# Patient Record
Sex: FEMALE | Race: OTHER
[De-identification: names, ages, dates, MRNs, and addresses within clinical notes are randomized per-mention and may not be internally consistent; named-entity substitution may affect disease eponyms.]

---

## 2020-06-07 NOTE — ER DOCUMENT REPORT
ED Medical Screen (RME)





- General


Stated Complaint: BELLY BUTTON INJURY


Time Seen by Provider: 06/07/20 23:39


Mode of Arrival: Ambulatory


Information source: Patient


Notes: 





Patient is an otherwise healthy 19-year-old female presenting to the emergency 

department chief complaint of bleeding from a new piercing on her bellybutton.  

She states this started around 2:00 this afternoon immediately after having the 

piercing done.  She states it has been continuously bleeding since that time.





Active bleeding noted in triage.  New dressing applied.





I have greeted and performed a rapid initial assessment of this patient.  A 

comprehensive ED assessment and evaluation of the patient, analysis of test 

results and completion of the medical decision making process will be conducted 

by additional ED providers.  I have specifically instructed the patient or 

family members with the patient to immediately return to any nursing staff 

should anything change in the patient's condition or with their chief complaint.








Physical Exam





- Vital signs


Vitals: 





                                        











Temp Pulse Resp BP Pulse Ox


 


 99.1 F   127 H  20   156/102 H  99 


 


 06/07/20 23:37  06/07/20 23:37  06/07/20 23:37  06/07/20 23:37  06/07/20 23:37














Course





- Vital Signs


Vital signs: 





                                        











Temp Pulse Resp BP Pulse Ox


 


 99.1 F   127 H  20   156/102 H  99 


 


 06/07/20 23:41  06/07/20 23:37  06/07/20 23:37  06/07/20 23:37  06/07/20 23:37

## 2020-06-08 NOTE — ER DOCUMENT REPORT
Entered by LAWANDA MUÑOZ SCRIBE  20 [id number] 





Acting as scribe for:FLOR BRIZUELA IV, MD





ED Wound





- General


Chief Complaint: Puncture Wound


Stated Complaint: BELLY BUTTON INJURY


Time Seen by Provider: 20 23:39


Mode of Arrival: Ambulatory


Information source: Patient


Notes: 





This 19 year old female patient presents to the ED today with complaints of 

bleeding from a new belly button piercing. Patient states that the bleeding 

started immediately after she got the piercing around 1400 yesterday afternoon 

and that it has continued to bleed since. She notes pain in that area, which was

expected, but denies any other symptoms.





Past Medical History





- General


Information source: Patient





- Social History


Smoking Status: Never Smoker


Cigarette use (# per day): No


Chew tobacco use (# tins/day): No


Smoking Education Provided: No


Family History: Reviewed & Not Pertinent


Patient has suicidal ideation: No


Patient has homicidal ideation: No





Review of Systems





- Review of Systems


Constitutional: No symptoms reported


EENT: No symptoms reported


Cardiovascular: No symptoms reported


Respiratory: No symptoms reported


Gastrointestinal: See HPI, Abdominal pain


Genitourinary: No symptoms reported


Female Genitourinary: No symptoms reported


Musculoskeletal: No symptoms reported


Skin: See HPI, Other - Bleeding from belly button piercing


Hematologic/Lymphatic: No symptoms reported


Neurological/Psychological: No symptoms reported


-: Yes All other systems reviewed and negative





Physical Exam





- Vital signs


Vitals: 


                                        











Temp Pulse Resp BP Pulse Ox


 


 99.1 F   127 H  20   156/102 H  99 


 


 20 23:37  20 23:37  20 23:37  20 23:37  20 23:37














- General


General appearance: Alert


In distress: None





- HEENT


Head: Normocephalic, Atraumatic


Eyes: Normal


Pupils: PERRL





- Respiratory


Respiratory status: No respiratory distress


Chest status: Nontender


Breath sounds: Normal


Chest palpation: Normal





- Cardiovascular


Rhythm: Regular


Heart sounds: Normal auscultation


Murmur: No


Friction rub: No


Gallop: None auscultated





- Abdominal


Inspection: Other - Belly button piercing


Distension: No distension


Bowel sounds: Normal


Tenderness: Nontender - Abdomen soft


Organomegaly: No organomegaly





- Back


Back: Normal, Nontender





- Extremities


General upper extremity: Normal inspection


General lower extremity: Normal inspection





- Neurological


Neuro grossly intact: Yes


Orientation: AAOx4


[place] Coma Scale Eye Opening: Spontaneous


Frazer Coma Scale Verbal: Oriented


[place]er Coma Scale Motor: Obeys Commands


Denise Coma Scale Total: 15





- Psychological


Associated symptoms: Normal affect, Normal mood





- Skin


Skin irregularity: other - Belly button piercing with persistent bleeding





Course





- Re-evaluation


Re-evalutation: 





20 01:59


Treatment and plan of care discussed with patient.  Patient instructed to return

in 5 days for suture removal.  Emergency signs and symptoms, reasons to return 

to the emergency department discussed patient.  All questions were answered 

prior to discharge.





- Vital Signs


Vital signs: 


                                        











Temp Pulse Resp BP Pulse Ox


 


 100.2 F   110 H  18   162/104 H  99 


 


 20 00:15  20 00:15  20 00:15  20 00:15  20 00:15














Procedures





- Laceration/Wound Repair


  ** Abdomen


Time completed: 02:00 - To persistently bleeding punctate sites one present in 

the same Manish her umbilicus and 1 just superior to the umbilicus.  The new 

piercing was left in and sutures were placed above and below where the piercing 

ends emanating from


Wound length (cm): 0.2


Wound's Depth, Shape: Superficial


Laceration pre-procedure: Sterile PPE donned, Chloraprep applied, Sterile drapes

applied


Anesthetic type: 1% Lidocaine w/epi


Volume Anesthetic (mLs): 5


Wound explored: Clean


Wound Repaired With: Sutures


Suture Size/Type: 4:0, Prolene


Number of Sutures: 4 - Simple interrupted


Layer Closure?: No


Post-procedure wound care: Sterile dressing applied


Post-procedure NV exam normal: Yes


Complications: No





Discharge





- Discharge


Clinical Impression: 


Puncture wound of abdomen


Qualifiers:


 Encounter type: initial encounter Qualified Code(s): S31.139A - Puncture wound 

of abdominal wall without foreign body, unspecified quadrant without penetration

into peritoneal cavity, initial encounter





Condition: Good


Disposition: HOME, SELF-CARE


Additional Instructions: 


Return to the Emergency Department without delay if any worse.





Follow-up with your doctor or return to the emergency department in 5 days for 

suture removal.  Seek medical care sooner if your piercing sites become very 

red, swollen, painful or ooze pus or start to persistently bleed again.











HOME CARE INSTRUCTIONS & INFORMATION:  Thank you for choosing us for your 

medical needs. We hope you're satisfied with the care you received.  After you 

leave, you must properly care for your problem and, at the same time, observe 

its progress.  Any condition can change.  Some illnesses can change rapidly over

hours or days.  If your condition worsens, return to the Emergency Department or

see your physician promptly.





ABOUT YOUR X-RAYS AND EKG'S:   If you had an EKG or X-rays taken, they have been

read by the Emergency Physician. The X-rays and EKG's will also be read by a 

Radiologist or Cardiologist within 24 hours.  If discrepancies are noted, you 

will be notified by telephone.  Please be certain the ED has a correct telephone

number & address where you can be reached.  Also, realize that some fractures or

abnormalities do not show up on initial X-rays.  If your symptoms continue, see 

your physician.





ABOUT YOUR LABORATORY TEST:   If you had laboratory tests, the results have been

reviewed by the Emergency Physician.  Some test results (for example cultures) 

may not be available for several days.  You will be contacted if any test result

shows you need additional treatment.  Please be certain the ED has a correct 

telephone number and address where you can be reached.





ABOUT YOUR MEDICATIONS:  You will receive instructions on how to take your 

medicine on the prescription label you receive.  Additional information may be 

provided by the Pharmacy.  If you have questions afterwards, call the ED for 

clarification or further instructions.  Some prescribed medications may cause 

drowsiness.  Do not perform tasks such as driving a car or operating machinery 

without consulting your Pharmacist.  If you feel you need a refill of pain 

medication, your condition will need re-evaluation.  Please do not call for a 

refill of any medication.





ABOUT YOUR SIGNATURE:   Signature of this document acknowledges to followin. Understanding that you received emergency treatment and that you may be 

   released before al medical problems are known or treated. Please be certain  

   the ED has a correct phone number & address where you can be reached.


   2. Acknowledgement that you will arrange for follow-up care as recommended.


   3. Authorization for the Emergency Physician to provide information to your 

follow-up Physician in order to maximize your care.





AT ANY TIME, IF YOUR SYMPTOMS CHANGE SIGNIFICANTLY OR WORSEN OR YOU DEVELOP NEW 

SYMPTOMS, RETURN TO THE EMERGENCY DEPARTMENT IMMEDIATELY FOR RE-EVALUATION.





OUR GOAL IS TO PROVIDE EXCELLENT MEDICAL CARE!





WE HOPE THAT WE HAVE MET YOUR EXPECTATIONS DURING YOUR EMERGENCY DEPARTMENT 

VISIT AND THAT YOU FEEL YOU HAVE RECEIVED EXCELLENT CARE!














I personally performed the services described in the documentation, reviewed and

edited the documentation which was dictated to the scribe in my presence, and it

accurately records my words and actions.

## 2020-06-13 NOTE — ER DOCUMENT REPORT
ED Suture/Wound Recheck





- General


Chief Complaint: Suture Removal


Stated Complaint: REMOVAL OF SUTURES


Time Seen by Provider: 06/13/20 13:40


Primary Care Provider: 


[facility] [Provider Group] - Follow up as needed


[facility] [Provider Group] - Follow up as needed


Mode of Arrival: Ambulatory


Information source: Patient


Notes: 





19-year-old female presented to ED for suture removal from her umbilical area.  

She states that she had a bellybutton ring placed on the seventh and they 

somehow hit a artery that bled for about 7 hours and she had to have sutures 

placed in order to stop the bleeding.  She states she was told to have them 

removed in 5 days.  She is now the 13th and is here to have her sutures removed.

 There is no signs of any infection no bleeding noted.  She states she is not 

having any pain or discomfort.  We will remove the sutures at this time.  She 

still has the bellybutton ring in place and there is no signs of infection.





- HPI


Previous ED treatment: Bellybutton piercing bleeding


Quality of pain: No pain


Severity: None


Pain Level: Denies


Context: Other - Bellybutton piercing


Symptoms since procedure: No complaints


Exacerbated by: Denies


Relieved by: Denies





- Related Data


Allergies/Adverse Reactions: 


                                        





No Known Allergies Allergy (Unverified 06/13/20 13:40)


   











Past Medical History





- General


Information source: Patient





- Social History


Smoking Status: Never Smoker


Frequency of alcohol use: None


Drug Abuse: None


Family History: Reviewed & Not Pertinent


Patient has homicidal ideation: No





- Past Medical History


Cardiac Medical History: Reports: None


Pulmonary Medical History: Reports: None


EENT Medical History: Reports: None


Neurological Medical History: Reports: None


Endocrine Medical History: Reports: None


Renal/ Medical History: Reports: None


Malignancy Medical History: Reports: None


GI Medical History: Reports: None


Musculoskeletal Medical History: Reports None


Skin Medical History: Reports None


Psychiatric Medical History: Reports: None


Traumatic Medical History: Reports: None





Review of Systems





- Review of Systems


Constitutional: No symptoms reported


EENT: No symptoms reported


Cardiovascular: No symptoms reported


Respiratory: No symptoms reported


Gastrointestinal: No symptoms reported


Genitourinary: No symptoms reported


Female Genitourinary: No symptoms reported


Musculoskeletal: No symptoms reported


Skin: Other - Sutures around the leg is due to a "bellybutton ring "


Hematologic/Lymphatic: No symptoms reported


Neurological/Psychological: No symptoms reported


-: Yes All other systems reviewed and negative





Physical Exam





- Vital signs


Vitals: 


                                        











Temp Pulse Resp BP Pulse Ox


 


 99.1 F   99 H  16   122/71   100 


 


 06/13/20 13:39  06/13/20 13:39  06/13/20 13:39  06/13/20 13:39  06/13/20 13:39











Interpretation: Normal





- General


General appearance: Appears well, Alert





- HEENT


Head: Normocephalic, Atraumatic


Eyes: Normal


Pupils: PERRL





- Respiratory


Respiratory status: No respiratory distress


Chest status: Nontender


Breath sounds: Normal


Chest palpation: Normal





- Cardiovascular


Rhythm: Regular


Heart sounds: Normal auscultation


Murmur: No





- Abdominal


Inspection: Normal, Other - Sutures around both ends of the bellybutton 

piercing.  No redness no inflammation no drainage.  Area has healed well no 

signs of infection


Distension: No distension


Bowel sounds: Normal


Tenderness: Nontender


Organomegaly: No organomegaly





- Back


Back: Normal, Nontender





- Extremities


General upper extremity: Normal inspection, Nontender, Normal color, Normal ROM,

Normal temperature


General lower extremity: Normal inspection, Nontender, Normal color, Normal ROM,

Normal temperature, Normal weight bearing.  No: Rhonda's sign





- Neurological


Neuro grossly intact: Yes


Cognition: Normal


Orientation: AAOx4


[place] Coma Scale Eye Opening: Spontaneous


[place] Coma Scale Verbal: Oriented


[place] Coma Scale Motor: Obeys Commands


Denise Coma Scale Total: 15


Speech: Normal


Motor strength normal: LUE, RUE, LLE, RLE


Sensory: Normal





- Psychological


Associated symptoms: Normal affect, Normal mood





- Skin


Skin Temperature: Warm


Skin Moisture: Dry


Skin Color: Normal





Course





- Re-evaluation


Re-evalutation: 





06/13/20 22:17


Sutures removed, bacitracin applied, patient was discharged home to follow-up 

with her primary care doctor.  Patient tolerated procedure well.





- Vital Signs


Vital signs: 


                                        











Temp Pulse Resp BP Pulse Ox


 


 99.1 F   99 H  16   122/71   100 


 


 06/13/20 13:40  06/13/20 13:39  06/13/20 13:39  06/13/20 13:39  06/13/20 13:39














Discharge





- Discharge


Clinical Impression: 


 Visit for suture removal





Condition: Stable


Disposition: HOME, SELF-CARE


Instructions:  Suture Removal


Additional Instructions: 


SOAP CLEANSING:


     Gently wash the wound daily using a mild soap (like Ivory, Phisoderm, 

Neutrogena).  Use warm water, rubbing gently until all debris, ooze, and 

crusting have been washed from the wound.  Allow to dry briefly (about 10 

minutes) after cleaning.  Repeat this cleansing at least three times a day for 

the first two days and then once or twice a day.








ANTIBIOTIC OINTMENT PROTECTION:


     Your wounds are such that dressing them is not practical or optional.  

After cleansing, you should apply a thin coating of antibiotic ointment 

(Bacitracin, not Neosporin) to the wounds at least three times daily.  This 

lessens infection risk, and may decrease the amount of scarring.  Use a q-tip or

dull butter knife, not your finger, to apply this ointment.


     Any debris or ooze which builds up in the ointment should be gently rubbed 

off with a sterile gauze pad.  Harder crusting may need to be gently scrubbed 

off with a clean wash cloth with soap and warm water, perhaps applying a warm, 

wet wash cloth to the wound for ten minutes first.


     Development of redness, severe itching, or blistering may mean allergy to 

the ointment.  See the doctor.





FOLLOW-UP CARE:


If you have been referred to a physician for follow-up care, call the 

physicians office for an appointment as you were instructed or within the next 

two days.  If you experience worsening or a significant change in your symptoms,

notify the physician immediately or return to the Emergency Department at any 

time for re-evaluation.


Referrals: 


[facility] [Provider Group] - Follow up as needed


[facility] [Provider [facility]] - Follow up as needed

## 2020-06-30 NOTE — ER DOCUMENT REPORT
HPI





- HPI


Patient complains to provider of: UTI


Time Seen by Provider: 06/30/20 11:00


Onset: Last week


Onset/Duration: Persistent


Quality of pain: Burning


Pain Level: 3


Context: 





Patient presents complaining of dysuria and urgency for the past week.  Patient 

is concerned about possible UTI.  Patient did finish antibiotics to treat strep 

throat recently.  Patient does have concerns about possible STI.  No flank or 

abdominal pain.


Associated Symptoms: denies: Fever, Nausea, Vomiting


Exacerbated by: Denies


Relieved by: Denies


Similar symptoms previously: Yes


Recently seen / treated by doctor: Yes





- ROS


ROS below otherwise negative: Yes


Systems Reviewed and Negative: Yes All other systems reviewed and negative





- CONSTITUTIONAL


Constitutional: DENIES: Fever, Chills





- GASTROINTESTINAL


Gastrointestinal: DENIES: Abdominal Pain, Nausea, Patient vomiting





- URINARY


Urinary: REPORTS: Dysuria, Urgency





- REPRODUCTIVE


Reproductive: DENIES: Pregnant:





- MUSCULOSKELETAL


Musculoskeletal: DENIES: Back Pain





- DERM


Skin Color: Normal


Skin Problems: None





Past Medical History





- General


Information source: Patient





- Social History


Smoking Status: Never Smoker


Frequency of alcohol use: None


Drug Abuse: None


Occupation: Dental assistant


Family History: Reviewed & Not Pertinent


Renal/ Medical History: Reports: Hx Ovarian Cysts


Surgical Hx: Negative





Vertical Provider Document





- CONSTITUTIONAL


Agree With Documented VS: Yes


Exam Limitations: No Limitations


General Appearance: WD/WN, No Apparent Distress





- HEENT


HEENT: Atraumatic, Normocephalic





- NECK


Neck: Normal Inspection, Supple





- RESPIRATORY


Respiratory: Breath Sounds Normal, No Respiratory Distress





- CARDIOVASCULAR


Cardiovascular: Regular Rate, Regular Rhythm





- GI/ABDOMEN


Gastrointestinal: Abdomen Soft





- BACK


Back: Normal Inspection.  negative: CVA Tenderness-Right, CVA Tenderness-Left





- MUSCULOSKELETAL/EXTREMETIES


Musculoskeletal/Extremeties: OLAYINKA FROM





- NEURO


Level of Consciousness: Awake, Alert, Appropriate


Motor/Sensory: No Motor Deficit





- DERM


Integumentary: Warm, Dry, No Rash





Course





- Re-evaluation


Re-evalutation: 





06/30/20


Patient with UTI, no concern for pyelonephritis or sepsis at this time.  Patient

reports mild nausea after taking the antibiotic.  Prescription will be written 

for some antiemetic medication.  Discussed worsening signs or symptoms that 

patient should return immediately for.





- Vital Signs


Vital signs: 


                                        











Temp Pulse Resp BP Pulse Ox


 


 98.5 F   82   14   124/85   98 


 


 06/30/20 10:16  06/30/20 10:16  06/30/20 10:16  06/30/20 10:16  06/30/20 10:16














- Laboratory


Laboratory results interpreted by me: 





06/30/20 11:58


                              Labs- All tests 24 hr











  06/30/20 06/30/20





  11:17 11:17


 


Urine Color  YELLOW 


 


Urine Appearance  CLOUDY 


 


Urine pH  7.0 


 


Ur Specific Gravity  1.015 


 


Urine Protein  NEGATIVE 


 


Urine Glucose (UA)  NEGATIVE 


 


Urine Ketones  NEGATIVE 


 


Urine Blood  SMALL H 


 


Urine Nitrite  NEGATIVE 


 


Urine Bilirubin  NEGATIVE 


 


Urine Urobilinogen  NEGATIVE 


 


Ur Leukocyte Esterase  LARGE H 


 


Urine WBC (Auto)  >182 


 


Urine RBC (Auto)  15 


 


Urine Bacteria (Auto)  TRACE 


 


Urine WBC Clumps  FEW 


 


Squamous Epi Cells Auto  6 


 


Urine Mucus (Auto)  RARE 


 


Urine Ascorbic Acid  NEGATIVE 


 


Epi Cells (Wet Prep)   4+ EPITHELIALS SEEN


 


Bacteria (Wet Prep)   4+ BACTERIA SEEN


 


Trichomonas (Wet Prep)   NO TRICHOMONAS SEEN


 


Vaginal WBC   2+ WBCS SEEN


 


Vaginal Yeast   NO YEAST SEEN














Discharge





- Discharge


Clinical Impression: 


 Bacterial vaginosis





UTI (urinary tract infection)


Qualifiers:


 Urinary tract infection type: site unspecified Hematuria presence: with 

hematuria Qualified Code(s): N39.0 - Urinary tract infection, site not specified





Condition: Stable


Disposition: HOME, SELF-CARE


Instructions:  Trimethoprim-Sulfa ([facility]), Urinary Anesthetic Agent ([facility]), Urinary

Tract Infection ([facility]), Vaginosis, Bacterial ([facility])


Additional Instructions: 


Return immediately for any new or worsening symptoms





Followup with your primary care provider, call tomorrow to make a followup 

appointment





Urine culture is pending, we will call if you need any different treatment


Prescriptions: 


Sulfamethoxazole/Trimethoprim [Bactrim Ds Tablet] 1 each PO BID #14 tablet


Metronidazole [Flagyl 500 mg Tablet] 500 mg PO BID #14 tablet


Phenazopyridine HCl [Pyridium 200 mg Tablet] 200 mg PO TID #15 tablet


Ondansetron [Zofran Odt 4 mg Tablet] 1 tab PO Q6H PRN #10 tab.rapdis


 PRN Reason: 


Forms:  Return to Work


Referrals: 


[facility] [Provider Group] - Follow up as needed

## 2020-09-05 NOTE — ER DOCUMENT REPORT
ED Medical Screen (RME)





- General


Chief Complaint: Pain With Urination


Stated Complaint: PAINFUL URINATION,BACK PAIN


Time Seen by Provider: 09/05/20 15:39


Mode of Arrival: Ambulatory


Information source: Patient


Notes: 





20-year-old female presents to ED for complaint of flank pain feeling sick 

dizziness blurred vision.  She states she was seen 2 days ago at urgent care 

they diagnosed her with a UTI and started on Bactrim DS.  She states she has 

become more dizzy has blurred vision and is feeling more sick.  She states they 

gave her Toradol and Rocephin IM while in the urgent care and then sent her home

with a prescription for Bactrim.  She states she has taken the Bactrim for 2 

days but the last time she took Bactrim she did get sick and was blurry vision. 

She states the pain in her urine the frequency and the back pain have all gotten

much worse in the last 2 days.














I have greeted and performed a rapid initial assessment of this patient.  A 

comprehensive ED assessment and evaluation of the patient, analysis of test 

results and completion of medical decision making process will be conducted by 

an additional ED providers.





- Related Data


Allergies/Adverse Reactions: 


                                        





sulfamethoxazole [From Bactrim] Adverse Reaction (Verified 09/05/20 15:42)


   Vomiting


trimethoprim [From Bactrim] Adverse Reaction (Verified 09/05/20 15:42)


   Vomiting











Past Medical History


Renal/ Medical History: Reports: Hx Ovarian Cysts





- Immunizations


Immunizations up to date: Yes


Hx Diphtheria, Pertussis, Tetanus Vaccination: Yes





Physical Exam





- Vital signs


Vitals: 





                                        











Temp Pulse Resp BP Pulse Ox


 


 98.1 F   107 H  16   132/85 H  100 


 


 09/05/20 15:41  09/05/20 15:41  09/05/20 15:41  09/05/20 15:41  09/05/20 15:41














Course





- Vital Signs


Vital signs: 





                                        











Temp Pulse Resp BP Pulse Ox


 


 98.1 F   107 H  16   132/85 H  100 


 


 09/05/20 15:41  09/05/20 15:41  09/05/20 15:41  09/05/20 15:41  09/05/20 15:41

## 2020-09-05 NOTE — ER DOCUMENT REPORT
ED GI/





- General


Chief Complaint: Flank Pain


Stated Complaint: PAINFUL URINATION,BACK PAIN


Time Seen by Provider: 20 15:39


Mode of Arrival: Ambulatory


Information source: Patient


Notes: 





This 20-year-old female presents to the emergency department with a 2-week 

history of dysuria, urgency frequency.  States that she has been seen at 

multiple urgent cares and was told that her urine was negative and was not 

treated.  On Thursday she was seen at a local urgent care, the urine revealed 

significant infection, she was given a IM injection of Rocephin and started on 

Bactrim DS.  Patient states that over the past 24 hours she is developed fever, 

increasing left flank and abdominal discomfort.  She is also had nausea, 

vomiting and feeling lightheaded and dizzy.  She has a history of a UTI in the 

past, POCS and is otherwise a healthy 20-year-old.





- Related Data


Allergies/Adverse Reactions: 


                                        





sulfamethoxazole [From Bactrim] Adverse Reaction (Verified 20 15:42)


   Vomiting


trimethoprim [From Bactrim] Adverse Reaction (Verified 20 15:42)


   Vomiting








Home Medications: Junel Fe





Past Medical History





- General


Information source: Patient





- Social History


Smoking Status: Never Smoker


Frequency of alcohol use: None


Drug Abuse: None


Family History: Reviewed & Not Pertinent


Renal/ Medical History: Reports: Hx Ovarian Cysts





- Immunizations


Immunizations up to date: Yes


Hx Diphtheria, Pertussis, Tetanus Vaccination: Yes





Review of Systems





- Review of Systems


Notes: 





Constitutional: Negative for fever.


HENT: Negative for sore throat.


Eyes: Negative for visual changes.


Cardiovascular: Negative for chest pain.


Respiratory: Negative for shortness of breath.


Gastrointestinal: See HPI


Genitourinary: See HPI


Musculoskeletal: Negative for back pain.


Skin: Negative for rash.


Neurological: Negative for headaches, weakness or numbness.





10 point ROS negative except as marked above and in HPI.





Physical Exam





- Vital signs


Vitals: 


                                        











Temp Pulse Resp BP Pulse Ox


 


 98.1 F   107 H  16   132/85 H  100 


 


 20 15:41  20 15:41  20 15:41  20 15:41  20 15:41














- Notes


Notes: 





PHYSICAL EXAMINATION:


 


Physical Exam:


General: Well-nourished well-developed 20-year-old female in no acute distress


HEENT: NC/AT, pupils equal round and reactive to light, MM moist,nares clear, 

oropharynx clear, airway patent


Neck: supple, no adenopathy, no masses.  Good range of motion


Lungs: clear, no wheezing, no rales no rhonchi


CVS: Regular rate and rhythm no murmur gallop or rub


Abdomen: Soft, active, nontender, no masses, no hepatosplenomegaly


Back: + CVA tenderness left flank


Ext:   No edema, clubbing or cyanosis.


Neuro: Alert and responsive, moving all 4 extremities on command, cranial nerves

intact, no focal findings


Skin: Intact no open lesions, no rash


PSYCH: Normal mood, normal affect.


 








Course





- Re-evaluation


Re-evalutation: 





20 19:35


Patient states that she is doing much better at this time, her nausea has 

resolved and her back pain has also improved.  Urine is positive for leukocytes 

and CT of the abdomen and pelvis is negative for kidney stones or findings s

uggestive of pyelonephritis.  I explained to the patient that she can be treated

as an outpatient we will give her medicines for nausea and pain as well as a 

change of antibiotic.  Patient is in agreement with this plan is ready for 

discharge.





- Vital Signs


Vital signs: 


                                        











Temp Pulse Resp BP Pulse Ox


 


 98.1 F   107 H  16   132/85 H  100 


 


 20 15:41  20 15:41  20 15:41  20 15:41  20 15:41














- Laboratory


Result Diagrams: 


                                 20 15:58





                                 20 15:58


Laboratory results interpreted by me: 


                                        











  20





  15:58 15:58 16:33


 


RDW  14.9 H  


 


Lymph % (Auto)  11.4 L  


 


BUN   5 L 


 


Urine Protein    30 H


 


Urine Blood    MODERATE H


 


Ur Leukocyte Esterase    TRACE H











20 19:37


I have reviewed laboratory data and used this information for the treatment 

decisions regarding the patient.





- Diagnostic Test


Radiology reviewed: Image reviewed, Reports reviewed


Radiology results interpreted by me: 





20 19:36


CT abdomen and pelvis without contrast: No acute ureteral stone, no findings 

compatible with pyelonephritis.





Discharge





- Discharge


Clinical Impression: 


 Nausea





Urinary tract infection


Qualifiers:


 Urinary tract infection type: site unspecified Hematuria presence: with 

hematuria Qualified Code(s): N39.0 - Urinary tract infection, site not specified





Back pain


Qualifiers:


 Back pain location: low back pain Chronicity: unspecified Back pain laterality:

left Sciatica presence: without sciatica Qualified Code(s): M54.5 - Low back pa

in





Condition: Good


Disposition: HOME, SELF-CARE


Instructions:  Cephalexin ([facility]), Urinary Tract Infection ([facility])


Additional Instructions: 


You were seen in the emergency department today with urinary tract infection and

secondary symptoms of nausea and back pain.  You are being discharged home with 

medications for nausea and for pain as well as a change of antibiotics.  Please 

take these medications as prescribed drink plenty of fluids and follow-up with 

your primary care doctor as needed.





If your symptoms are worsening or if you have other concerns you may return to 

the emergency department for further evaluation and treatment








HOME CARE INSTRUCTIONS & INFORMATION:  Thank you for choosing us for your 

medical needs. We hope you're satisfied with the care you received.  After you 

leave, you must properly care for your problem and, at the same time, observe 

its progress.  Any condition can change.  Some illnesses can change rapidly over

hours or days.  If your condition worsens, return to the Emergency Department or

see your physician promptly.





ABOUT YOUR X-RAYS AND EKG'S:   If you had an EKG or X-rays taken, they have been

read by the Emergency Physician. The X-rays and EKG's will also be read by a 

Radiologist or Cardiologist within 24 hours.  If discrepancies are noted, you 

will be notified by telephone.  Please be certain the ED has a correct telephone

number & address where you can be reached.  Also, realize that some fractures or

abnormalities do not show up on initial X-rays.  If your symptoms continue, see 

your physician.





ABOUT YOUR LABORATORY TEST:   If you had laboratory tests, the results have been

reviewed by the Emergency Physician.  Some test results (for example cultures) 

may not be available for several days.  You will be contacted if any test result

shows you need additional treatment.  Please be certain the ED has a correct 

telephone number and address where you can be reached.





ABOUT YOUR MEDICATIONS:  You will receive instructions on how to take your 

medicine on the prescription label you receive.  Additional information may be 

provided by the Pharmacy.  If you have questions afterwards, call the ED for 

clarification or further instructions.  Some prescribed medications may cause 

drowsiness.  Do not perform tasks such as driving a car or operating machinery 

without consulting your Pharmacist.  If you feel you need a refill of pain 

medication, your condition will need re-evaluation.  Please do not call for a 

refill of any medication.





ABOUT YOUR SIGNATURE:   Signature of this document acknowledges to followin. Understanding that you received emergency treatment and that you may be 

released before al medical problems are known or treated. Please be certain   

the ED has a correct phone number & address where you can be reached.


   2. Acknowledgement that you will arrange for follow-up care as recommended.


   3. Authorization for the Emergency Physician to provide information to your 

follow-up Physician in order to maximize your care.





AT ANY TIME, IF YOUR SYMPTOMS CHANGE SIGNIFICANTLY OR WORSEN OR YOU DEVELOP NEW 

SYMPTOMS, RETURN TO THE EMERGENCY DEPARTMENT IMMEDIATELY FOR RE-EVALUATION.





OUR GOAL IS TO PROVIDE EXCELLENT MEDICAL CARE!





WE HOPE THAT WE HAVE MET YOUR EXPECTATIONS DURING YOUR EMERGENCY DEPARTMENT 

VISIT AND THAT YOU FEEL YOU HAVE RECEIVED EXCELLENT CARE!











Prescriptions: 


Cephalexin Monohydrate [Keflex 500 mg Capsule] 500 mg PO Q8 10 Days #30 capsule


Ondansetron [Zofran Odt 4 mg Tablet] 1 - 2 tab PO Q4H PRN #10 tab.rapdis


 PRN Reason: For Nausea/Vomiting


Ondansetron [Zofran Odt 4 mg Tablet] 1 - 2 tab PO Q4H PRN #12 tab.rapdis


 PRN Reason: For Nausea/Vomiting

## 2021-01-18 ENCOUNTER — HOSPITAL ENCOUNTER (EMERGENCY)
Dept: HOSPITAL 62 - ER | Age: 21
Discharge: HOME | End: 2021-01-18
Payer: MEDICAID

## 2021-01-18 VITALS — SYSTOLIC BLOOD PRESSURE: 106 MMHG | DIASTOLIC BLOOD PRESSURE: 66 MMHG

## 2021-01-18 DIAGNOSIS — Z20.822: ICD-10-CM

## 2021-01-18 DIAGNOSIS — O21.8: Primary | ICD-10-CM

## 2021-01-18 DIAGNOSIS — Z88.3: ICD-10-CM

## 2021-01-18 DIAGNOSIS — Z3A.14: ICD-10-CM

## 2021-01-18 LAB
ADD MANUAL DIFF: NO
ALBUMIN SERPL-MCNC: 4.2 G/DL (ref 3.5–5)
ALP SERPL-CCNC: 60 U/L (ref 38–126)
ANION GAP SERPL CALC-SCNC: 6 MMOL/L (ref 5–19)
APPEARANCE UR: (no result)
APTT PPP: YELLOW S
AST SERPL-CCNC: 22 U/L (ref 14–36)
BASOPHILS # BLD AUTO: 0 10^3/UL (ref 0–0.2)
BASOPHILS NFR BLD AUTO: 0.4 % (ref 0–2)
BILIRUB DIRECT SERPL-MCNC: 0.1 MG/DL (ref 0–0.4)
BILIRUB SERPL-MCNC: 0.4 MG/DL (ref 0.2–1.3)
BILIRUB UR QL STRIP: NEGATIVE
BUN SERPL-MCNC: 3 MG/DL (ref 7–20)
CALCIUM: 9.6 MG/DL (ref 8.4–10.2)
CHLORIDE SERPL-SCNC: 102 MMOL/L (ref 98–107)
CO2 SERPL-SCNC: 26 MMOL/L (ref 22–30)
EOSINOPHIL # BLD AUTO: 0 10^3/UL (ref 0–0.6)
EOSINOPHIL NFR BLD AUTO: 0.1 % (ref 0–6)
ERYTHROCYTE [DISTWIDTH] IN BLOOD BY AUTOMATED COUNT: 15 % (ref 11.5–14)
GLUCOSE SERPL-MCNC: 96 MG/DL (ref 75–110)
GLUCOSE UR STRIP-MCNC: NEGATIVE MG/DL
HCT VFR BLD CALC: 37.9 % (ref 36–47)
HGB BLD-MCNC: 13.5 G/DL (ref 12–15.5)
KETONES UR STRIP-MCNC: 80 MG/DL
LYMPHOCYTES # BLD AUTO: 0.9 10^3/UL (ref 0.5–4.7)
LYMPHOCYTES NFR BLD AUTO: 10.5 % (ref 13–45)
MCH RBC QN AUTO: 29.7 PG (ref 27–33.4)
MCHC RBC AUTO-ENTMCNC: 35.6 G/DL (ref 32–36)
MCV RBC AUTO: 84 FL (ref 80–97)
MONOCYTES # BLD AUTO: 0.5 10^3/UL (ref 0.1–1.4)
MONOCYTES NFR BLD AUTO: 5.5 % (ref 3–13)
NEUTROPHILS # BLD AUTO: 6.9 10^3/UL (ref 1.7–8.2)
NEUTS SEG NFR BLD AUTO: 83.5 % (ref 42–78)
NITRITE UR QL STRIP: NEGATIVE
PH UR STRIP: 7 [PH] (ref 5–9)
PLATELET # BLD: 242 10^3/UL (ref 150–450)
POTASSIUM SERPL-SCNC: 3.8 MMOL/L (ref 3.6–5)
PROT SERPL-MCNC: 7.4 G/DL (ref 6.3–8.2)
PROT UR STRIP-MCNC: 30 MG/DL
RBC # BLD AUTO: 4.53 10^6/UL (ref 3.72–5.28)
SP GR UR STRIP: 1.02
TOTAL CELLS COUNTED % (AUTO): 100 %
UROBILINOGEN UR-MCNC: NEGATIVE MG/DL (ref ?–2)
WBC # BLD AUTO: 8.3 10^3/UL (ref 4–10.5)

## 2021-01-18 PROCEDURE — 80053 COMPREHEN METABOLIC PANEL: CPT

## 2021-01-18 PROCEDURE — 84702 CHORIONIC GONADOTROPIN TEST: CPT

## 2021-01-18 PROCEDURE — 96360 HYDRATION IV INFUSION INIT: CPT

## 2021-01-18 PROCEDURE — 87086 URINE CULTURE/COLONY COUNT: CPT

## 2021-01-18 PROCEDURE — 36415 COLL VENOUS BLD VENIPUNCTURE: CPT

## 2021-01-18 PROCEDURE — 85025 COMPLETE CBC W/AUTO DIFF WBC: CPT

## 2021-01-18 PROCEDURE — 81001 URINALYSIS AUTO W/SCOPE: CPT

## 2021-01-18 PROCEDURE — 83690 ASSAY OF LIPASE: CPT

## 2021-01-18 PROCEDURE — C9803 HOPD COVID-19 SPEC COLLECT: HCPCS

## 2021-01-18 PROCEDURE — 87635 SARS-COV-2 COVID-19 AMP PRB: CPT

## 2021-01-18 PROCEDURE — 99284 EMERGENCY DEPT VISIT MOD MDM: CPT

## 2021-01-18 NOTE — ER DOCUMENT REPORT
ED Medical Screen (RME)





- General


Chief Complaint: Nausea/Vomiting


Stated Complaint: NAUSEA/VOMITING 14 WKS PREGNANT


Time Seen by Provider: 01/18/21 17:43


Mode of Arrival: Ambulatory


Information source: Patient


Notes: 





20-year-old female presented to ED for complaint of nausea and vomiting x24 

hours.  She states she is 14 weeks pregnant.  She states she has had some 

chills.  She states she has had nausea off and on while she was pregnant but 

never where it was all day and she cannot keep anything down.  She is alert 

oriented respirations regular nonlabored speaking in full sentences.  She states

she did try Zofran one time when she was not pregnant and it did not help her 

nausea.  She states she does not smoke drink or use any illicit drugs.  She 

states her last menstrual period was 10/10/2020

















I have greeted and performed a rapid initial assessment of this patient.  A 

comprehensive ED assessment and evaluation of the patient, analysis of test 

results and completion of medical decision making process will be conducted by 

an additional ED providers.





- Related Data


Allergies/Adverse Reactions: 


                                        





sulfamethoxazole [From Bactrim] Adverse Reaction (Verified 09/05/20 15:42)


   Vomiting


trimethoprim [From Bactrim] Adverse Reaction (Verified 09/05/20 15:42)


   Vomiting











Past Medical History


Renal/ Medical History: Reports: Hx Ovarian Cysts





- Immunizations


Immunizations up to date: Yes


Hx Diphtheria, Pertussis, Tetanus Vaccination: Yes





Physical Exam





- Vital signs


Vitals: 





                                        











Temp Pulse Resp BP Pulse Ox


 


 98.9 F   101 H  18   113/80   98 


 


 01/18/21 17:44  01/18/21 17:44  01/18/21 17:44  01/18/21 17:44  01/18/21 17:44














Course





- Vital Signs


Vital signs: 





                                        











Temp Pulse Resp BP Pulse Ox


 


 98.9 F   101 H  18   113/80   98 


 


 01/18/21 17:44  01/18/21 17:44  01/18/21 17:44  01/18/21 17:44  01/18/21 17:44

## 2021-01-18 NOTE — ER DOCUMENT REPORT
ED GI/





- General


Chief Complaint: Nausea/Vomiting


Stated Complaint: NAUSEA/VOMITING 14 WKS PREGNANT


Time Seen by Provider: 01/18/21 17:43


Primary Care Provider: 


BIRGIT VERA PA-C [Primary Care Provider] - Follow up as needed


SHYLA BAI MD [ACTIVE STAFF] - Follow up in 3-5 days


Mode of Arrival: Ambulatory





- [facility]


Notes: 





01/18/21 19:40


Patient is a 20-year-old female who is 14 weeks pregnant and a G1, P0 who 

presents with nausea and vomiting for the past 24 hours.  Patient states she has

had a slight bit of diarrhea that has resolved.  She has had nausea and 

vomiting.  She states she is vomiting clear fluid.  She has been unable to keep 

anything down.  She has not had any significance morning sickness up until now. 

[facility] and [facility]up.  She does not have 

any nausea medication at home because she has not needed it.  No chest pain or 

shortness of breath.  No lightheadedness or headaches.  No fevers.  No abdominal

pain.  She denies any vaginal bleeding or concerns for the pregnancy.  No 

urinary symptoms.


01/18/21 20:55








- Related Data


Allergies/Adverse Reactions: 


                                        





sulfamethoxazole [From Bactrim] Adverse Reaction (Verified 01/18/21 18:54)


   Vomiting


trimethoprim [From Bactrim] Adverse Reaction (Verified 01/18/21 18:54)


   Vomiting











Past Medical History





- General


Information source: Patient





- Social History


Smoking Status: Never Smoker


Family History: Reviewed & Not Pertinent


Renal/ Medical History: Reports: Hx Ovarian Cysts





- Immunizations


Immunizations up to date: Yes


Hx Diphtheria, Pertussis, Tetanus Vaccination: Yes





Review of Systems





- Review of Systems


Notes: 





CONSTITUTIONAL:  No fever, fatigue or weight loss.  


SKIN:  No rash.  


HENT:  No congestion, ear pain, or sore throat.  


EYES:  No recent vision problems or eye pain.  


CARDIOVASCULAR:  No chest pain or edema.


RESPIRATORY:  No cough, shortness of breath, congestion, or wheezing.  


GASTROINTESTINAL:  No abdominal pain.  Positive for nausea and vomiting.  

Positive for diarrhea that has resolved.


GENITOURINARY: No dysuria.  No vaginal bleeding.


MUSCULOSKELETAL:  No joint pain or swelling.  


LYMPHATIC: No swollen glands. 


NEUROLOGIC:  No seizures. No headache, focal weakness or sensory changes. 


HEMATOLOGIC:  No unusual bruising or bleeding.  


PSYCHIATRIC:  No depression or anxiety.





Physical Exam





- Vital signs


Vitals: 


                                        











Temp Pulse Resp BP Pulse Ox


 


 98.9 F   101 H  18   113/80   98 


 


 01/18/21 17:44  01/18/21 17:44  01/18/21 17:44  01/18/21 17:44  01/18/21 17:44














- General


General appearance: Appears well


In distress: None


Notes: 





VITAL SIGNS: Within normal limits.


GENERAL:  No acute distress, non-toxic appearance.  


HEAD:  Normal with no signs of head trauma.


EYES:  Conjunctiva normal, no discharge.  


EARS:  Hearing grossly intact.


NOSE: Normal.


NECK:  Normal range of motion, no tenderness, supple, no lymphadenopathy, No 

adenopathy, no JVD.   


CHEST:  Clear breath sounds bilaterally.  No wheezes, rales, or rhonchi.  


CARDIAC:  Regular rate and rhythm.


VASCULAR:  No Edema.  Peripheral pulses normal and equal in all extremities.


ABDOMEN: Normal and soft with no tenderness, no masses or pulsatile masses.


GENITOURINARY: Normal, No tenderness


MUSCULOSKELETAL:  Good range of motion of all major joints. Extremities without 

clubbing, cyanosis or edema.  


NEUROLOGICAL:  Alert and oriented x 3.  No focal sensory or strength deficits.  

Speech normal.  Follows commands appropriately.


PSYCHIATRIC:  Normal Affect, judgement and mood.


SKIN:  Normal appearance with no rashes or lesions.





Course





- Re-evaluation


Re-evalutation: 





01/18/21 19:44


Patient appears well on exam.  She is not nauseous or vomiting.  She was given 

Reglan and fluids.  Patient denies any sick contacts.  She did eat fast food 

before this started.  Likely, patient can be discharged home with antiemetics 

and close follow-up.  She is not having any abdominal pain or pregnancy symptoms

so I do not think she requires an ultrasound in the ER.  On reassessment, 

patient is drinking water in the room.  She is smiling and states she feels much

better.  I did discuss with her that she needs to follow-up with OB.  I will 

discharge her with a short course of Reglan.  I did discuss other possible 

causes of nausea and vomiting including Covid.  Patient did want to be tested.  

I told her that she needs to self isolate until she is called with a negative 

result.  Patient is very agreeable to this.  She was given strict return 

precautions.


01/18/21 19:46





01/18/21 20:56








- Vital Signs


Vital signs: 


                                        











Temp Pulse Resp BP Pulse Ox


 


 98.1 F   75   20   106/66   97 


 


 01/18/21 20:51  01/18/21 20:51  01/18/21 20:51  01/18/21 20:51  01/18/21 20:51














- Laboratory Results


Result Diagrams: 


                                 01/18/21 18:19





                                 01/18/21 18:19


Laboratory Results Interpreted: 


                                        











  01/18/21 01/18/21 01/18/21





  18:19 18:19 18:44


 


RDW  15.0 H  


 


Lymph % (Auto)  10.5 L  


 


Seg Neutrophils %  83.5 H  


 


Sodium   134.0 L 


 


BUN   3 L 


 


Beta HCG, Quant   83033.00 H 


 


Urine Protein    30 H


 


Urine Ketones    80 H


 


Urine Ascorbic Acid    40 H











Critical Laboratory Results Reviewed: No Critical Results





- Radiology Results


Critical Radiology Results Reviewed: No Critical Results





Discharge





- Discharge


Clinical Impression: 


 Suspected COVID-19 virus infection





Nausea and vomiting


Qualifiers:


 Vomiting type: unspecified Vomiting Intractability: non-intractable Qualified 

Code(s): R11.2 - Nausea with vomiting, unspecified





Condition: Stable


Disposition: HOME, SELF-CARE


Instructions:  COVID-19 Guidance for Persons Under Investigation, Nausea or 

Vomiting, Nonspecific ([facility])


Additional Instructions: 


Your work-up today is reassuring.  Please follow-up with OB.  Make sure you are 

staying hydrated.  Return to the ER immediately for any return of symptoms or 

worsening symptoms or abdominal pain.





You have been tested for Covid.  You must self isolate until you are called with

a negative result.  Please return to the ER for any shortness of breath or other

concerning symptoms.


Prescriptions: 


Metoclopramide HCl [Reglan] 5 mg PO TID #10 tablet


Referrals: 


BIRGIT VERA PA-C [Primary Care Provider] - Follow up as needed


SHYLA BAI MD [ACTIVE STAFF] - Follow up in 3-5 days

## 2021-01-27 NOTE — RADIOLOGY REPORT (SQ)
EXAM DESCRIPTION:  CT ABD/PELVIS NO ORAL OR IV



IMAGES COMPLETED DATE/TIME:  9/5/2020 5:23 pm



REASON FOR STUDY:  flank pain hematuria



COMPARISON:  None.



TECHNIQUE:  CT scan of the abdomen and pelvis performed without intravenous or oral contrast. Images 
reviewed with lung, soft tissue, and bone windows. Reconstructed coronal and sagittal MPR images revi
ewed. All images stored on PACS.

All CT scanners at this facility use dose modulation, iterative reconstruction, and/or weight based d
osing when appropriate to reduce radiation dose to as low as reasonably achievable (ALARA).

[facility]: Dose Right  [facility]: CareDose    [facility]: Dose Right    [facility]: Teradose 4D    [facility]: Smart ScaleXtreme



RADIATION DOSE:  CT Rad equipment meets quality standard of care and radiation dose reduction techniq
ues were employed. CTDIvol: 5.2 mGy. DLP: 263 mGy-cm.mGy.



LIMITATIONS:  None.



FINDINGS:  LOWER CHEST: No significant findings. No nodules or infiltrates.

NON-CONTRASTED LIVER, SPLEEN, ADRENALS: Evaluation limited by lack of IV contrast. No identified sign
ificant masses.

PANCREAS: No masses. No peripancreatic inflammatory changes.

GALLBLADDER: No identified stones by CT criteria. No inflammatory changes to suggest cholecystitis.

RIGHT KIDNEY AND URETER: No solid masses. No significant calcification. No hydronephrosis or hydroure
ter.

LEFT KIDNEY AND URETER: No solid masses. No significant calcification. No hydronephrosis or hydrouret
er.

AORTA AND RETROPERITONEUM: No aneurysm. No retroperitoneal masses or adenopathy.

BOWEL AND PERITONEAL CAVITY: No obvious masses or inflammatory changes. No free fluid.

APPENDIX: Normal.

PELVIS, BLADDER, AND ABDOMINAL WALL:No abnormal masses. No free fluid. Bladder normal.

BONES: No significant findings.

OTHER: No other significant finding.



IMPRESSION:  NO SIGNIFICANT OR ACUTE PROCESS IN THE ABDOMEN OR PELVIS.



TECHNICAL DOCUMENTATION:  JOB ID:  [id number]

Quality ID # [id number]: Final reports with documentation of one or more dose reduction techniques (e.g., Au
tomated exposure control, adjustment of the mA and/or kV according to patient size, use of iterative 
reconstruction technique)

 2011 [employer]- All Rights Reserved



Reading location - IP/workstation name: [email or web address] [id number] Patient is early pregnancy, maybe 4 weeks, and she is having symptoms of mastitis, requesting something for relief.